# Patient Record
Sex: MALE | Race: WHITE | Employment: OTHER | URBAN - METROPOLITAN AREA
[De-identification: names, ages, dates, MRNs, and addresses within clinical notes are randomized per-mention and may not be internally consistent; named-entity substitution may affect disease eponyms.]

---

## 2018-05-02 ENCOUNTER — TRANSFERRED RECORDS (OUTPATIENT)
Dept: HEALTH INFORMATION MANAGEMENT | Facility: CLINIC | Age: 58
End: 2018-05-02

## 2019-03-13 ENCOUNTER — TRANSFERRED RECORDS (OUTPATIENT)
Dept: HEALTH INFORMATION MANAGEMENT | Facility: CLINIC | Age: 59
End: 2019-03-13

## 2020-02-17 ENCOUNTER — TRANSFERRED RECORDS (OUTPATIENT)
Dept: HEALTH INFORMATION MANAGEMENT | Facility: CLINIC | Age: 60
End: 2020-02-17

## 2020-04-13 ENCOUNTER — TRANSFERRED RECORDS (OUTPATIENT)
Dept: HEALTH INFORMATION MANAGEMENT | Facility: CLINIC | Age: 60
End: 2020-04-13

## 2020-04-16 ENCOUNTER — TRANSFERRED RECORDS (OUTPATIENT)
Dept: HEALTH INFORMATION MANAGEMENT | Facility: CLINIC | Age: 60
End: 2020-04-16

## 2020-04-22 ENCOUNTER — TRANSCRIBE ORDERS (OUTPATIENT)
Dept: OTHER | Age: 60
End: 2020-04-22

## 2020-04-22 DIAGNOSIS — N40.0 BPH (BENIGN PROSTATIC HYPERPLASIA): Primary | ICD-10-CM

## 2020-04-24 ENCOUNTER — TRANSFERRED RECORDS (OUTPATIENT)
Dept: HEALTH INFORMATION MANAGEMENT | Facility: CLINIC | Age: 60
End: 2020-04-24

## 2020-04-27 ENCOUNTER — MEDICAL CORRESPONDENCE (OUTPATIENT)
Dept: HEALTH INFORMATION MANAGEMENT | Facility: CLINIC | Age: 60
End: 2020-04-27

## 2020-04-28 ENCOUNTER — TRANSCRIBE ORDERS (OUTPATIENT)
Dept: OTHER | Age: 60
End: 2020-04-28

## 2020-04-28 DIAGNOSIS — N40.1 BENIGN PROSTATIC HYPERPLASIA WITH LOWER URINARY TRACT SYMPTOMS, SYMPTOM DETAILS UNSPECIFIED: Primary | ICD-10-CM

## 2020-04-29 ENCOUNTER — PRE VISIT (OUTPATIENT)
Dept: UROLOGY | Facility: CLINIC | Age: 60
End: 2020-04-29

## 2020-04-29 NOTE — TELEPHONE ENCOUNTER
Reason for Visit: Consult    Diagnosis: BPH - enlarged prostate    Orders/Procedures/Records: Records available    Contact Patient: N/A    Rooming Requirements: Virtual check in      Thuy Roe  04/29/20  3:36 PM

## 2020-04-30 NOTE — TELEPHONE ENCOUNTER
MEDICAL RECORDS REQUEST   [facility]  Urology Clinic  [address] [place] [address]  PHONE: [phone]  Fax: [phone]        FUTURE VISIT INFORMATION                                                   Pj Monge, : 1960 scheduled for future visit at [facility]    APPOINTMENT INFORMATION:    Date: 20 2PM    Provider:  Koko Price MD    Reason for Visit/Diagnosis: New BPH PT    REFERRAL INFORMATION:    Referring provider:  N/A    Specialty: N/A    Referring providers clinic:  [facility]    Clinic contact number:  N/A    RECORDS REQUESTED FOR VISIT                                                     NOTES  STATUS/DETAILS   OFFICE NOTE from referring provider  yes   OFFICE NOTE from other specialist  yes   DISCHARGE SUMMARY from hospital  no   DISCHARGE REPORT from the ER  no   OPERATIVE REPORT  no   MEDICATION LIST  yes   LABS     URINALYSIS (UA)/ PSA  yes     PRE-VISIT CHECKLIST      Record collection complete Yes- [facility] recs scanned in [facility]rology recs scanned in [facility]    Appointment appropriately scheduled           (right time/right provider) Yes   MyChart activation If no, please explain: In process    Questionnaire complete If no, please explain: In process      Completed by: Beti Craig

## 2020-05-04 ENCOUNTER — DOCUMENTATION ONLY (OUTPATIENT)
Dept: CARE COORDINATION | Facility: CLINIC | Age: 60
End: 2020-05-04

## 2020-05-07 ENCOUNTER — TELEPHONE (OUTPATIENT)
Dept: UROLOGY | Facility: CLINIC | Age: 60
End: 2020-05-07

## 2020-05-07 NOTE — TELEPHONE ENCOUNTER
Spoke with patient to convert their upcoming visit with Dr. Price to a Video Visit. Patient will use their smart phone.      Thuy Roe EMT

## 2020-05-12 ENCOUNTER — DOCUMENTATION ONLY (OUTPATIENT)
Dept: UROLOGY | Facility: CLINIC | Age: 60
End: 2020-05-12

## 2020-05-12 ENCOUNTER — PRE VISIT (OUTPATIENT)
Dept: UROLOGY | Facility: CLINIC | Age: 60
End: 2020-05-12

## 2020-05-12 NOTE — PROGRESS NOTES
Appointment with Dr. Price rescheduled from 5/12 to 5/19 to allow for sufficient records (imaging and Dr. Lazcano's notes) to be added into the patient's chart.     Patient contacted [facility] - Dr. Lazcano to push the appropriate records today (5/12).    Beti Craig was contacted (5/12) to ensure she is aware to look for and receive these records.     Patient stated understanding of the situation and will be ready for a Video Visit on 5/19 at 12:20 PM.      Thuy Roe, EMT

## 2020-05-13 NOTE — TELEPHONE ENCOUNTER
[facility] PACS:  Images:   CT ABD PELVIS 4/16/20   MR Prostate 4/16/20, 5/2/18,7/12/17   CT CHEST ABD 3/13/19, 7/12/18     Notes:   4/13/20 OV note   Labs and follow up notes 12/19/18   OV note 6/27/18   OV note 7/12/17   OV note 7/6/17     Fax to [facility] to fax recs and to not attach recs in [facility] PACS - 5/13/20 8:40AM

## 2020-05-19 ENCOUNTER — VIRTUAL VISIT (OUTPATIENT)
Dept: UROLOGY | Facility: CLINIC | Age: 60
End: 2020-05-19
Payer: COMMERCIAL

## 2020-05-19 DIAGNOSIS — R33.9 URINARY RETENTION: Primary | ICD-10-CM

## 2020-05-19 NOTE — PROGRESS NOTES
Pj Monge is a 59 year old male who is being evaluated via a billable video visit.                UROLOGY VIDEO NEW PATIENT NOTE           Chief Complaint:   Urinary Retention         Interval Update    Pj Monge is a very pleasant 58 yo M with large gland BPH and urinary retention    Brief  History: He has been followed for several years by his referring urologist Dr. Lazcano.  He has a history of elevated PSA between 20 and 29 and had a prostate biopsy 12/16 which was benign.  Prostate volume at the time was 108 gm.  His PSA was 15.8 7/17 and volume 120 gm with 2 PiRADS 3 lesions (later re-read at [facility] as PiRADS 2)  Had another template saturation prostate biopsy 6/18 which was benign 5/18.  Underwent an LAR around that time as well for rectal cancer. PSA has since remained elevated at 19.3 on 2/20.  Over this time period he has continued to manage his bladder with CIC.  He has no issue passing a catheter. Had been deferring any attempt at outlet surgery for quite some time but has recebtly changed his mind as he is sick of catheterizing and is seeking a surgical alternative to allow him to naturally void.      Today notes: Doing fine from a urinary perspective.  Continues to cath 4-5 times per day.  Has not had any issues with hematuria or UTI for the past several weeks but was seen at [facility] 5 weeks ago for presumed septic episode with positive urine vulture.  He can occasionally void small volumes if he waits too long. He does note that he has issues with constipation and difficulty emptying his rectum.  Has questions whether this may be from his massively enlarged prostate.  Underwent prostate MRI 3 weeks ago which revealed prostate size estimated at 173 gm, no PiRADS 3 or greater lesions seen.    Denies family hx of prostate cancer      Physical Exam:   General Appearance: Well groomed, hygenic  Eyes: No redness, discharge  Respiratory: No cough, no respiratory distress or  labored breathing  Musculoskeletal:  grossly normal, full range of motion in upper extremities, no gross deficits  Skin: No discoloration or apparent rashes  Neurologic - No tremors  Psychiatric - Alert and oriented  The rest of a comprehensive physical examination is deferred due to public health emergency video visit restrictions      Labs and Pathology:    I personally reviewed all applicable laboratory data and went over findings with patient  Significant for:    4/13/20 - Cr 1.15      Imaging:    I personally reviewed all applicable imaging including the images themselves and report below and went over the below findings with patient.    No results found for this or any previous visit.           Assessment/Plan   59 year old male with urinary retention, enlarged prostate  After discussion of medical and surgical treatments for BPH including alpha blockers, anticholinergics, transurethral resection, laser ablation, enucleation and simple prostatectomy, Mr. Monge has decided to proceed with Holmium Laser Enucleation of the Prostate (HoLEP).    We discussed the associated risks of this procedure included but not limited to the following:  -Bleeding, potentially significant enough to require clot evacuation and blood transfusion  -Infection, for which we will plan to treat preoperatively based on targeted antibiotic therapy  -Damage to the bladder, urethra and penis including the risk of urethral stricture and bladder neck contracture  -Risk of incidentally discovered prostate cancer.  We discussed that this would not preclude him from further therapy though it could prolong recovery and potentially increase risk of complications associated with cancer treatment.  Further preoperative workup to assess for prostate cancer prior to surgery was offered but patient deferred.  -Risk of retrograde ejaculation which would be expected to occur in the majority if not all men after HoLEP  -Risk of urinary incontinence.   We discussed that in the majority of men this is a transient process that is generally self limited to the first 6-12 weeks after surgery though could take longer to resolve depending on baseline bladder instability.  -Risk of postperative urinary retention though in published series HoLEP has been found to be associated with high success rates of achieving spontaneous voiding even in men with underactive and atonic bladders.  -We will proceed with preoperative clearance with preference to minimize all anticoagulation as deemed acceptable by his primary care provider.          Past Medical History:     Past Medical History:   Diagnosis Date     RA (rheumatoid arthritis) (H)      Rectal cancer (H)             Past Surgical History:   LAR         Medications     Current Outpatient Medications   Medication     baclofen (LIORESAL) 10 MG tablet     medical cannabis (Patient's own supply)     tamsulosin (FLOMAX) 0.4 MG capsule     venlafaxine (EFFEXOR) 75 MG tablet     No current facility-administered medications for this visit.             Family History:   No family history on file.         Social History:     Social History     Socioeconomic History     Marital status:      Spouse name: Not on file     Number of children: Not on file     Years of education: Not on file     Highest education level: Not on file   Occupational History     Not on file   Social Needs     Financial resource strain: Not on file     Food insecurity     Worry: Not on file     Inability: Not on file     Transportation needs     Medical: Not on file     Non-medical: Not on file   Tobacco Use     Smoking status: Current Every Day Smoker     Smokeless tobacco: Never Used   Substance and Sexual Activity     Alcohol use: Not on file     Drug use: Not on file     Sexual activity: Not on file   Lifestyle     Physical activity     Days per week: Not on file     Minutes per session: Not on file     Stress: Not on file   Relationships     Social  "connections     Talks on phone: Not on file     Gets together: Not on file     Attends Congregational service: Not on file     Active member of club or organization: Not on file     Attends meetings of clubs or organizations: Not on file     Relationship status: Not on file     Intimate partner violence     Fear of current or ex partner: Not on file     Emotionally abused: Not on file     Physically abused: Not on file     Forced sexual activity: Not on file   Other Topics Concern     Parent/sibling w/ CABG, MI or angioplasty before 65F 55M? Not Asked   Social History Narrative     Not on file            Allergies:   Patient has no known allergies.         Review of Systems:  From intake questionnaire   Negative 14 system review except as noted on HPI, nurse's note.        CC:  No primary care provider on file.        The patient has been notified of following:     \"This video visit will be conducted via a call between you and your physician/provider. We have found that certain health care needs can be provided without the need for an in-person physical exam.  This service lets us provide the care you need with a video conversation.  If a prescription is necessary we can send it directly to your pharmacy.  If lab work is needed we can place an order for that and you can then stop by our lab to have the test done at a later time.    Video visits are billed at different rates depending on your insurance coverage.  Please reach out to your insurance provider with any questions.    If during the course of the call the physician/provider feels a video visit is not appropriate, you will not be charged for this service.\"    Patient has given verbal consent for Video visit? Yes    How would you like to obtain your AVS? Mail a copy    Patient would like the video invitation sent by: Text to cell phone: [phone]    Will anyone else be joining your video visit? No        Video-Visit Details    Type of service:  Video " Visit    Video Start Time: 12:26  Video End Time: 12:37 (transition to phone an addition 10 minutes due to poor connection)    Originating Location (pt. Location): Home    Distant Location (provider location):  [facility] UROLOGY AND [facility] FOR PROSTATE AND UROLOGIC CANCERS     Platform used for Video Visit: Dionne Price MD

## 2020-05-19 NOTE — LETTER
5/19/2020       RE: Pj Monge  [address]  [address] [place] [address]     Dear Colleague,    Thank you for referring your patient, Pj Monge, to the [facility] UROLOGY AND [facility] FOR PROSTATE AND UROLOGIC CANCERS at [facility]. Please see a copy of my visit note below.    Pj Monge is a 59 year old male who is being evaluated via a billable video visit.                UROLOGY VIDEO NEW PATIENT NOTE           Chief Complaint:   Urinary Retention         Interval Update    Pj Monge is a very pleasant 58 yo M with large gland BPH and urinary retention    Brief  History: He has been followed for several years by his referring urologist Dr. Lazcano.  He has a history of elevated PSA between 20 and 29 and had a prostate biopsy 12/16 which was benign.  Prostate volume at the time was 108 gm.  His PSA was 15.8 7/17 and volume 120 gm with 2 PiRADS 3 lesions (later re-read at [facility] as PiRADS 2)  Had another template saturation prostate biopsy 6/18 which was benign 5/18.  Underwent an LAR around that time as well for rectal cancer. PSA has since remained elevated at 19.3 on 2/20.  Over this time period he has continued to manage his bladder with CIC.  He has no issue passing a catheter. Had been deferring any attempt at outlet surgery for quite some time but has recebtly changed his mind as he is sick of catheterizing and is seeking a surgical alternative to allow him to naturally void.      Today notes: Doing fine from a urinary perspective.  Continues to cath 4-5 times per day.  Has not had any issues with hematuria or UTI for the past several weeks but was seen at [facility] 5 weeks ago for presumed septic episode with positive urine vulture.  He can occasionally void small volumes if he waits too long. He does note that he has issues with constipation and difficulty emptying his rectum.  Has questions whether this may be from his massively  enlarged prostate.  Underwent prostate MRI 3 weeks ago which revealed prostate size estimated at 173 gm, no PiRADS 3 or greater lesions seen.    Denies family hx of prostate cancer      Physical Exam:   General Appearance: Well groomed, hygenic  Eyes: No redness, discharge  Respiratory: No cough, no respiratory distress or labored breathing  Musculoskeletal:  grossly normal, full range of motion in upper extremities, no gross deficits  Skin: No discoloration or apparent rashes  Neurologic - No tremors  Psychiatric - Alert and oriented  The rest of a comprehensive physical examination is deferred due to public health emergency video visit restrictions      Labs and Pathology:    I personally reviewed all applicable laboratory data and went over findings with patient  Significant for:    4/13/20 - Cr 1.15      Imaging:    I personally reviewed all applicable imaging including the images themselves and report below and went over the below findings with patient.    No results found for this or any previous visit.           Assessment/Plan   59 year old male with urinary retention, enlarged prostate  After discussion of medical and surgical treatments for BPH including alpha blockers, anticholinergics, transurethral resection, laser ablation, enucleation and simple prostatectomy, Mr. Monge has decided to proceed with Holmium Laser Enucleation of the Prostate (HoLEP).    We discussed the associated risks of this procedure included but not limited to the following:  -Bleeding, potentially significant enough to require clot evacuation and blood transfusion  -Infection, for which we will plan to treat preoperatively based on targeted antibiotic therapy  -Damage to the bladder, urethra and penis including the risk of urethral stricture and bladder neck contracture  -Risk of incidentally discovered prostate cancer.  We discussed that this would not preclude him from further therapy though it could prolong recovery and  potentially increase risk of complications associated with cancer treatment.  Further preoperative workup to assess for prostate cancer prior to surgery was offered but patient deferred.  -Risk of retrograde ejaculation which would be expected to occur in the majority if not all men after HoLEP  -Risk of urinary incontinence.  We discussed that in the majority of men this is a transient process that is generally self limited to the first 6-12 weeks after surgery though could take longer to resolve depending on baseline bladder instability.  -Risk of postperative urinary retention though in published series HoLEP has been found to be associated with high success rates of achieving spontaneous voiding even in men with underactive and atonic bladders.  -We will proceed with preoperative clearance with preference to minimize all anticoagulation as deemed acceptable by his primary care provider.          Past Medical History:     Past Medical History:   Diagnosis Date     RA (rheumatoid arthritis) (H)      Rectal cancer (H)             Past Surgical History:   LAR         Medications     Current Outpatient Medications   Medication     baclofen (LIORESAL) 10 MG tablet     medical cannabis (Patient's own supply)     tamsulosin (FLOMAX) 0.4 MG capsule     venlafaxine (EFFEXOR) 75 MG tablet     No current facility-administered medications for this visit.             Family History:   No family history on file.         Social History:     Social History     Socioeconomic History     Marital status:      Spouse name: Not on file     Number of children: Not on file     Years of education: Not on file     Highest education level: Not on file   Occupational History     Not on file   Social Needs     Financial resource strain: Not on file     Food insecurity     Worry: Not on file     Inability: Not on file     Transportation needs     Medical: Not on file     Non-medical: Not on file   Tobacco Use     Smoking status:  "Current Every Day Smoker     Smokeless tobacco: Never Used   Substance and Sexual Activity     Alcohol use: Not on file     Drug use: Not on file     Sexual activity: Not on file   Lifestyle     Physical activity     Days per week: Not on file     Minutes per session: Not on file     Stress: Not on file   Relationships     Social connections     Talks on phone: Not on file     Gets together: Not on file     Attends Scientology service: Not on file     Active member of club or organization: Not on file     Attends meetings of clubs or organizations: Not on file     Relationship status: Not on file     Intimate partner violence     Fear of current or ex partner: Not on file     Emotionally abused: Not on file     Physically abused: Not on file     Forced sexual activity: Not on file   Other Topics Concern     Parent/sibling w/ CABG, MI or angioplasty before 65F 55M? Not Asked   Social History Narrative     Not on file            Allergies:   Patient has no known allergies.         Review of Systems:  From intake questionnaire   Negative 14 system review except as noted on HPI, nurse's note.        CC:  No primary care provider on file.        The patient has been notified of following:     \"This video visit will be conducted via a call between you and your physician/provider. We have found that certain health care needs can be provided without the need for an in-person physical exam.  This service lets us provide the care you need with a video conversation.  If a prescription is necessary we can send it directly to your pharmacy.  If lab work is needed we can place an order for that and you can then stop by our lab to have the test done at a later time.    Video visits are billed at different rates depending on your insurance coverage.  Please reach out to your insurance provider with any questions.    If during the course of the call the physician/provider feels a video visit is not appropriate, you will not be charged " "for this service.\"    Patient has given verbal consent for Video visit? Yes    How would you like to obtain your AVS? Mail a copy    Patient would like the video invitation sent by: Text to cell phone: [phone]    Will anyone else be joining your video visit? No        Video-Visit Details    Type of service:  Video Visit    Video Start Time: 12:26  Video End Time: 12:37 (transition to phone an addition 10 minutes due to poor connection)    Originating Location (pt. Location): Home    Distant Location (provider location):  [facility] UROLOGY AND [facility] FOR PROSTATE AND UROLOGIC CANCERS     Platform used for Video Visit: Dionne Price MD    "

## 2020-05-26 DIAGNOSIS — R33.9 URINARY RETENTION: Primary | ICD-10-CM

## 2020-05-26 NOTE — PATIENT INSTRUCTIONS
Our team will be contacting you regarding your procedure.     It was a pleasure meeting with you today.  Thank you for allowing me and my team the privilege of caring for you today.  YOU are the reason we are here, and I truly hope we provided you with the excellent service you deserve.  Please let us know if there is anything else we can do for you so that we can be sure you are leaving completely satisfied with your care experience.        Lakhwinder Decker, EMT

## 2020-06-16 DIAGNOSIS — Z11.59 ENCOUNTER FOR SCREENING FOR OTHER VIRAL DISEASES: Primary | ICD-10-CM

## 2020-06-16 PROBLEM — R33.9 URINARY RETENTION: Status: ACTIVE | Noted: 2020-06-16

## 2020-06-22 ENCOUNTER — TELEPHONE (OUTPATIENT)
Dept: UROLOGY | Facility: CLINIC | Age: 60
End: 2020-06-22

## 2020-06-22 NOTE — TELEPHONE ENCOUNTER
Patient is scheduled for surgery with Dr. Price      Spoke or left message with: Pj    Date of Surgery: 7/16/2020    Location: [place] OR    Informed patient they will need an adult  yes    Pre-op with surgeon (if applicable): n/a    H&P: Patient to schedule with pcp at [facility]    POP: 8/25/2020 @ 9a    Additional imaging/appointments: n/a    Surgery packet: mailed on 6/22/2020     Additional comments: n/a

## 2020-07-01 DIAGNOSIS — R30.0 DYSURIA: Primary | ICD-10-CM

## 2020-07-03 ENCOUNTER — PATIENT OUTREACH (OUTPATIENT)
Dept: UROLOGY | Facility: CLINIC | Age: 60
End: 2020-07-03

## 2020-07-10 ENCOUNTER — PATIENT OUTREACH (OUTPATIENT)
Dept: UROLOGY | Facility: CLINIC | Age: 60
End: 2020-07-10

## 2020-07-10 DIAGNOSIS — R30.0 DYSURIA: Primary | ICD-10-CM

## 2020-07-10 RX ORDER — CIPROFLOXACIN 500 MG/1
500 TABLET, FILM COATED ORAL 2 TIMES DAILY
Qty: 14 TABLET | Refills: 0 | Status: SHIPPED | OUTPATIENT
Start: 2020-07-10 | End: 2020-07-17

## 2020-07-10 NOTE — TELEPHONE ENCOUNTER
I called and spoke with patient to let him know to  and start taking Cipro on Monday in preparation for surgery next week. Patient agreed to plan. Melissa Garduno RN

## 2020-07-13 DIAGNOSIS — Z11.59 ENCOUNTER FOR SCREENING FOR OTHER VIRAL DISEASES: ICD-10-CM

## 2020-07-13 PROCEDURE — U0003 INFECTIOUS AGENT DETECTION BY NUCLEIC ACID (DNA OR RNA); SEVERE ACUTE RESPIRATORY SYNDROME CORONAVIRUS 2 (SARS-COV-2) (CORONAVIRUS DISEASE [COVID-19]), AMPLIFIED PROBE TECHNIQUE, MAKING USE OF HIGH THROUGHPUT TECHNOLOGIES AS DESCRIBED BY CMS-2020-01-R: HCPCS | Performed by: UROLOGY

## 2020-07-13 PROCEDURE — 99207 ZZC NO CHARGE LOS: CPT

## 2020-07-14 LAB
SARS-COV-2 RNA SPEC QL NAA+PROBE: NOT DETECTED
SPECIMEN SOURCE: NORMAL

## 2020-07-14 NOTE — PROGRESS NOTES
Spoke to patient, he believes he will be staying overnight for this procedure. He does not have a ride home post op if he is going home.  Called and discussed with CIRO TorresCC for Dr Price, she confirmed he would stay over night. Called pt back to confirm overnight stay

## 2020-07-15 ENCOUNTER — ANESTHESIA EVENT (OUTPATIENT)
Dept: SURGERY | Facility: CLINIC | Age: 60
End: 2020-07-15
Payer: COMMERCIAL

## 2020-07-16 ENCOUNTER — ANESTHESIA (OUTPATIENT)
Dept: SURGERY | Facility: CLINIC | Age: 60
End: 2020-07-16
Payer: COMMERCIAL

## 2020-07-16 ENCOUNTER — HOSPITAL ENCOUNTER (OUTPATIENT)
Facility: CLINIC | Age: 60
Discharge: HOME HEALTH | End: 2020-07-17
Attending: UROLOGY | Admitting: UROLOGY
Payer: COMMERCIAL

## 2020-07-16 DIAGNOSIS — R33.9 URINARY RETENTION: ICD-10-CM

## 2020-07-16 PROBLEM — N40.1 BPH WITH URINARY OBSTRUCTION: Status: ACTIVE | Noted: 2020-07-16

## 2020-07-16 PROBLEM — N13.8 BPH WITH URINARY OBSTRUCTION: Status: ACTIVE | Noted: 2020-07-16

## 2020-07-16 LAB
ABO + RH BLD: NORMAL
ABO + RH BLD: NORMAL
BLD GP AB SCN SERPL QL: NORMAL
BLOOD BANK CMNT PATIENT-IMP: NORMAL
GLUCOSE BLDC GLUCOMTR-MCNC: 114 MG/DL (ref 70–99)
SPECIMEN EXP DATE BLD: NORMAL

## 2020-07-16 PROCEDURE — 40000170 ZZH STATISTIC PRE-PROCEDURE ASSESSMENT II: Performed by: UROLOGY

## 2020-07-16 PROCEDURE — 82962 GLUCOSE BLOOD TEST: CPT

## 2020-07-16 PROCEDURE — 37000008 ZZH ANESTHESIA TECHNICAL FEE, 1ST 30 MIN: Performed by: UROLOGY

## 2020-07-16 PROCEDURE — 71000015 ZZH RECOVERY PHASE 1 LEVEL 2 EA ADDTL HR: Performed by: UROLOGY

## 2020-07-16 PROCEDURE — 86901 BLOOD TYPING SEROLOGIC RH(D): CPT | Performed by: UROLOGY

## 2020-07-16 PROCEDURE — 25800030 ZZH RX IP 258 OP 636: Performed by: NURSE ANESTHETIST, CERTIFIED REGISTERED

## 2020-07-16 PROCEDURE — 36415 COLL VENOUS BLD VENIPUNCTURE: CPT | Performed by: UROLOGY

## 2020-07-16 PROCEDURE — C1758 CATHETER, URETERAL: HCPCS | Performed by: UROLOGY

## 2020-07-16 PROCEDURE — 36000064 ZZH SURGERY LEVEL 4 EA 15 ADDTL MIN - UMMC: Performed by: UROLOGY

## 2020-07-16 PROCEDURE — 86900 BLOOD TYPING SEROLOGIC ABO: CPT | Performed by: UROLOGY

## 2020-07-16 PROCEDURE — 25000128 H RX IP 250 OP 636: Performed by: UROLOGY

## 2020-07-16 PROCEDURE — 86850 RBC ANTIBODY SCREEN: CPT | Performed by: UROLOGY

## 2020-07-16 PROCEDURE — 25000125 ZZHC RX 250: Performed by: UROLOGY

## 2020-07-16 PROCEDURE — 25000128 H RX IP 250 OP 636: Performed by: STUDENT IN AN ORGANIZED HEALTH CARE EDUCATION/TRAINING PROGRAM

## 2020-07-16 PROCEDURE — 27210794 ZZH OR GENERAL SUPPLY STERILE: Performed by: UROLOGY

## 2020-07-16 PROCEDURE — 25800030 ZZH RX IP 258 OP 636: Performed by: UROLOGY

## 2020-07-16 PROCEDURE — 25000132 ZZH RX MED GY IP 250 OP 250 PS 637: Performed by: ANESTHESIOLOGY

## 2020-07-16 PROCEDURE — 88305 TISSUE EXAM BY PATHOLOGIST: CPT | Mod: 26 | Performed by: UROLOGY

## 2020-07-16 PROCEDURE — 36000062 ZZH SURGERY LEVEL 4 1ST 30 MIN - UMMC: Performed by: UROLOGY

## 2020-07-16 PROCEDURE — 25000128 H RX IP 250 OP 636: Performed by: NURSE ANESTHETIST, CERTIFIED REGISTERED

## 2020-07-16 PROCEDURE — 25000132 ZZH RX MED GY IP 250 OP 250 PS 637: Performed by: UROLOGY

## 2020-07-16 PROCEDURE — 88305 TISSUE EXAM BY PATHOLOGIST: CPT | Performed by: UROLOGY

## 2020-07-16 PROCEDURE — 71000014 ZZH RECOVERY PHASE 1 LEVEL 2 FIRST HR: Performed by: UROLOGY

## 2020-07-16 PROCEDURE — 37000009 ZZH ANESTHESIA TECHNICAL FEE, EACH ADDTL 15 MIN: Performed by: UROLOGY

## 2020-07-16 PROCEDURE — 25000125 ZZHC RX 250: Performed by: NURSE ANESTHETIST, CERTIFIED REGISTERED

## 2020-07-16 PROCEDURE — 25000566 ZZH SEVOFLURANE, EA 15 MIN: Performed by: UROLOGY

## 2020-07-16 RX ORDER — OXYCODONE HYDROCHLORIDE 5 MG/1
5-10 TABLET ORAL EVERY 4 HOURS PRN
Status: DISCONTINUED | OUTPATIENT
Start: 2020-07-16 | End: 2020-07-17 | Stop reason: HOSPADM

## 2020-07-16 RX ORDER — ONDANSETRON 2 MG/ML
4 INJECTION INTRAMUSCULAR; INTRAVENOUS EVERY 30 MIN PRN
Status: DISCONTINUED | OUTPATIENT
Start: 2020-07-16 | End: 2020-07-16 | Stop reason: HOSPADM

## 2020-07-16 RX ORDER — NALOXONE HYDROCHLORIDE 0.4 MG/ML
.1-.4 INJECTION, SOLUTION INTRAMUSCULAR; INTRAVENOUS; SUBCUTANEOUS
Status: DISCONTINUED | OUTPATIENT
Start: 2020-07-16 | End: 2020-07-17 | Stop reason: HOSPADM

## 2020-07-16 RX ORDER — ACETAMINOPHEN 325 MG/1
650 TABLET ORAL EVERY 6 HOURS PRN
Status: DISCONTINUED | OUTPATIENT
Start: 2020-07-16 | End: 2020-07-17 | Stop reason: HOSPADM

## 2020-07-16 RX ORDER — FENTANYL CITRATE 50 UG/ML
25-50 INJECTION, SOLUTION INTRAMUSCULAR; INTRAVENOUS
Status: DISCONTINUED | OUTPATIENT
Start: 2020-07-16 | End: 2020-07-16 | Stop reason: HOSPADM

## 2020-07-16 RX ORDER — PROPOFOL 10 MG/ML
INJECTION, EMULSION INTRAVENOUS PRN
Status: DISCONTINUED | OUTPATIENT
Start: 2020-07-16 | End: 2020-07-16

## 2020-07-16 RX ORDER — SODIUM CHLORIDE, SODIUM LACTATE, POTASSIUM CHLORIDE, CALCIUM CHLORIDE 600; 310; 30; 20 MG/100ML; MG/100ML; MG/100ML; MG/100ML
INJECTION, SOLUTION INTRAVENOUS CONTINUOUS PRN
Status: DISCONTINUED | OUTPATIENT
Start: 2020-07-16 | End: 2020-07-16

## 2020-07-16 RX ORDER — POLYETHYLENE GLYCOL 3350 17 G
2 POWDER IN PACKET (EA) ORAL
Status: DISCONTINUED | OUTPATIENT
Start: 2020-07-16 | End: 2020-07-17 | Stop reason: HOSPADM

## 2020-07-16 RX ORDER — FENTANYL CITRATE 50 UG/ML
INJECTION, SOLUTION INTRAMUSCULAR; INTRAVENOUS PRN
Status: DISCONTINUED | OUTPATIENT
Start: 2020-07-16 | End: 2020-07-16

## 2020-07-16 RX ORDER — GABAPENTIN 100 MG/1
300 CAPSULE ORAL ONCE
Status: COMPLETED | OUTPATIENT
Start: 2020-07-16 | End: 2020-07-16

## 2020-07-16 RX ORDER — VENLAFAXINE HYDROCHLORIDE 75 MG/1
75 TABLET, EXTENDED RELEASE ORAL
Status: DISCONTINUED | OUTPATIENT
Start: 2020-07-17 | End: 2020-07-17 | Stop reason: HOSPADM

## 2020-07-16 RX ORDER — LIDOCAINE HYDROCHLORIDE 20 MG/ML
INJECTION, SOLUTION INFILTRATION; PERINEURAL PRN
Status: DISCONTINUED | OUTPATIENT
Start: 2020-07-16 | End: 2020-07-16

## 2020-07-16 RX ORDER — HYDROMORPHONE HYDROCHLORIDE 1 MG/ML
.3-.5 INJECTION, SOLUTION INTRAMUSCULAR; INTRAVENOUS; SUBCUTANEOUS EVERY 10 MIN PRN
Status: DISCONTINUED | OUTPATIENT
Start: 2020-07-16 | End: 2020-07-16 | Stop reason: HOSPADM

## 2020-07-16 RX ORDER — GLYCOPYRROLATE 0.2 MG/ML
INJECTION, SOLUTION INTRAMUSCULAR; INTRAVENOUS PRN
Status: DISCONTINUED | OUTPATIENT
Start: 2020-07-16 | End: 2020-07-16

## 2020-07-16 RX ORDER — ACETAMINOPHEN 325 MG/1
975 TABLET ORAL ONCE
Status: COMPLETED | OUTPATIENT
Start: 2020-07-16 | End: 2020-07-16

## 2020-07-16 RX ORDER — LIDOCAINE 40 MG/G
CREAM TOPICAL
Status: DISCONTINUED | OUTPATIENT
Start: 2020-07-16 | End: 2020-07-17 | Stop reason: HOSPADM

## 2020-07-16 RX ORDER — MEPERIDINE HYDROCHLORIDE 25 MG/ML
12.5 INJECTION INTRAMUSCULAR; INTRAVENOUS; SUBCUTANEOUS
Status: DISCONTINUED | OUTPATIENT
Start: 2020-07-16 | End: 2020-07-16 | Stop reason: HOSPADM

## 2020-07-16 RX ORDER — VENLAFAXINE HYDROCHLORIDE 75 MG/1
75 CAPSULE, EXTENDED RELEASE ORAL DAILY
COMMUNITY

## 2020-07-16 RX ORDER — ONDANSETRON 4 MG/1
4 TABLET, ORALLY DISINTEGRATING ORAL EVERY 30 MIN PRN
Status: DISCONTINUED | OUTPATIENT
Start: 2020-07-16 | End: 2020-07-16 | Stop reason: HOSPADM

## 2020-07-16 RX ORDER — NALOXONE HYDROCHLORIDE 0.4 MG/ML
.1-.4 INJECTION, SOLUTION INTRAMUSCULAR; INTRAVENOUS; SUBCUTANEOUS
Status: DISCONTINUED | OUTPATIENT
Start: 2020-07-16 | End: 2020-07-16 | Stop reason: HOSPADM

## 2020-07-16 RX ORDER — SODIUM CHLORIDE, SODIUM LACTATE, POTASSIUM CHLORIDE, CALCIUM CHLORIDE 600; 310; 30; 20 MG/100ML; MG/100ML; MG/100ML; MG/100ML
INJECTION, SOLUTION INTRAVENOUS CONTINUOUS
Status: DISCONTINUED | OUTPATIENT
Start: 2020-07-16 | End: 2020-07-16 | Stop reason: HOSPADM

## 2020-07-16 RX ORDER — ONDANSETRON 4 MG/1
4 TABLET, ORALLY DISINTEGRATING ORAL EVERY 6 HOURS PRN
Status: DISCONTINUED | OUTPATIENT
Start: 2020-07-16 | End: 2020-07-17 | Stop reason: HOSPADM

## 2020-07-16 RX ORDER — VANCOMYCIN HYDROCHLORIDE 1 G/200ML
1000 INJECTION, SOLUTION INTRAVENOUS EVERY 12 HOURS
Status: COMPLETED | OUTPATIENT
Start: 2020-07-16 | End: 2020-07-17

## 2020-07-16 RX ORDER — VENLAFAXINE 75 MG/1
75 TABLET ORAL 3 TIMES DAILY
Status: DISCONTINUED | OUTPATIENT
Start: 2020-07-16 | End: 2020-07-16

## 2020-07-16 RX ORDER — SODIUM CHLORIDE 9 MG/ML
INJECTION, SOLUTION INTRAVENOUS CONTINUOUS
Status: DISCONTINUED | OUTPATIENT
Start: 2020-07-16 | End: 2020-07-17

## 2020-07-16 RX ORDER — ATROPA BELLADONNA AND OPIUM 16.2; 3 MG/1; MG/1
30 SUPPOSITORY RECTAL 3 TIMES DAILY PRN
Status: DISCONTINUED | OUTPATIENT
Start: 2020-07-16 | End: 2020-07-17 | Stop reason: HOSPADM

## 2020-07-16 RX ORDER — VANCOMYCIN HYDROCHLORIDE 1 G/200ML
1000 INJECTION, SOLUTION INTRAVENOUS EVERY 12 HOURS
Status: DISCONTINUED | OUTPATIENT
Start: 2020-07-16 | End: 2020-07-16 | Stop reason: HOSPADM

## 2020-07-16 RX ORDER — ONDANSETRON 2 MG/ML
4 INJECTION INTRAMUSCULAR; INTRAVENOUS EVERY 6 HOURS PRN
Status: DISCONTINUED | OUTPATIENT
Start: 2020-07-16 | End: 2020-07-17 | Stop reason: HOSPADM

## 2020-07-16 RX ORDER — DEXAMETHASONE SODIUM PHOSPHATE 4 MG/ML
INJECTION, SOLUTION INTRA-ARTICULAR; INTRALESIONAL; INTRAMUSCULAR; INTRAVENOUS; SOFT TISSUE PRN
Status: DISCONTINUED | OUTPATIENT
Start: 2020-07-16 | End: 2020-07-16

## 2020-07-16 RX ADMIN — FENTANYL CITRATE 25 MCG: 50 INJECTION, SOLUTION INTRAMUSCULAR; INTRAVENOUS at 12:56

## 2020-07-16 RX ADMIN — HYDROMORPHONE HYDROCHLORIDE 0.5 MG: 1 INJECTION, SOLUTION INTRAMUSCULAR; INTRAVENOUS; SUBCUTANEOUS at 12:27

## 2020-07-16 RX ADMIN — DEXAMETHASONE SODIUM PHOSPHATE 4 MG: 4 INJECTION, SOLUTION INTRAMUSCULAR; INTRAVENOUS at 11:23

## 2020-07-16 RX ADMIN — TRANEXAMIC ACID 1 G: 100 INJECTION, SOLUTION INTRAVENOUS at 10:57

## 2020-07-16 RX ADMIN — MIDAZOLAM 2 MG: 1 INJECTION INTRAMUSCULAR; INTRAVENOUS at 11:03

## 2020-07-16 RX ADMIN — PROPOFOL 150 MG: 10 INJECTION, EMULSION INTRAVENOUS at 11:12

## 2020-07-16 RX ADMIN — FENTANYL CITRATE 50 MCG: 50 INJECTION, SOLUTION INTRAMUSCULAR; INTRAVENOUS at 11:12

## 2020-07-16 RX ADMIN — GABAPENTIN 300 MG: 300 CAPSULE ORAL at 09:22

## 2020-07-16 RX ADMIN — GLYCOPYRROLATE 0.2 MG: 0.2 INJECTION, SOLUTION INTRAMUSCULAR; INTRAVENOUS at 11:26

## 2020-07-16 RX ADMIN — GENTAMICIN SULFATE 240 MG: 40 INJECTION, SOLUTION INTRAMUSCULAR; INTRAVENOUS at 11:03

## 2020-07-16 RX ADMIN — NICOTINE POLACRILEX 2 MG: 2 LOZENGE ORAL at 22:09

## 2020-07-16 RX ADMIN — SODIUM CHLORIDE, POTASSIUM CHLORIDE, SODIUM LACTATE AND CALCIUM CHLORIDE: 600; 310; 30; 20 INJECTION, SOLUTION INTRAVENOUS at 11:03

## 2020-07-16 RX ADMIN — ROCURONIUM BROMIDE 50 MG: 10 INJECTION INTRAVENOUS at 11:12

## 2020-07-16 RX ADMIN — SUGAMMADEX 200 MG: 100 INJECTION, SOLUTION INTRAVENOUS at 13:16

## 2020-07-16 RX ADMIN — FENTANYL CITRATE 50 MCG: 50 INJECTION, SOLUTION INTRAMUSCULAR; INTRAVENOUS at 11:33

## 2020-07-16 RX ADMIN — VANCOMYCIN HYDROCHLORIDE 1000 MG: 1 INJECTION, SOLUTION INTRAVENOUS at 23:33

## 2020-07-16 RX ADMIN — ROCURONIUM BROMIDE 20 MG: 10 INJECTION INTRAVENOUS at 12:00

## 2020-07-16 RX ADMIN — ACETAMINOPHEN 975 MG: 325 TABLET, FILM COATED ORAL at 09:22

## 2020-07-16 RX ADMIN — GENTAMICIN SULFATE 140 MG: 40 INJECTION, SOLUTION INTRAMUSCULAR; INTRAVENOUS at 22:10

## 2020-07-16 RX ADMIN — LIDOCAINE HYDROCHLORIDE 60 MG: 20 INJECTION, SOLUTION INFILTRATION; PERINEURAL at 11:12

## 2020-07-16 RX ADMIN — VANCOMYCIN HYDROCHLORIDE 1000 MG: 1 INJECTION, SOLUTION INTRAVENOUS at 11:03

## 2020-07-16 RX ADMIN — PROPOFOL 30 MG: 10 INJECTION, EMULSION INTRAVENOUS at 12:56

## 2020-07-16 ASSESSMENT — MIFFLIN-ST. JEOR: SCORE: 1460.38

## 2020-07-16 NOTE — PROVIDER NOTIFICATION
Has Rheumatoid Arthritis.  All joints hurt at times but wrists hurt the most.  Can walk and do activities of daily living.

## 2020-07-16 NOTE — BRIEF OP NOTE
[facility] [place]    Brief Operative Note    Pre-operative diagnosis: Urinary retention [R33.9]  Post-operative diagnosis Same as pre-operative diagnosis    Procedure: Procedure(s):  ENUCLEATION, PROSTATE, USING HOLMIUM LASER  Surgeon: Surgeon(s) and Role:     * Koko Price MD - Primary     * Evan Grant MD - Resident - Assisting  Anesthesia: General   Estimated blood loss: 200 ml  Drains:  22F 3-way catheter  Specimens:   ID Type Source Tests Collected by Time Destination   A : prostate tissue Tissue Prostate SURGICAL PATHOLOGY EXAM Koko Price MD 7/16/2020  1:04 PM      Findings:   Bilobar appearance of prostate, each lobe taken separately, morcellation performed.  Complications: None.  Implants: * No implants in log *    Plan:  - Observe overnight on CBI  - Irrigate/TOV in AM  - Discharge with cipro

## 2020-07-16 NOTE — ANESTHESIA CARE TRANSFER NOTE
Patient: Pj Monge    Procedure(s):  ENUCLEATION, PROSTATE, USING HOLMIUM LASER    Diagnosis: Urinary retention [R33.9]  Diagnosis Additional Information: No value filed.    Anesthesia Type:   General     Note:  Airway :Face Mask  Patient transferred to:PACU  Comments: Report to [place] RN  Pt awake and talking. Aware of catheter discomfort.  VSS Temp 36.6 po C  RR 16, diminished (smoker, Nicotine patch placed right shoulder in OR)  145/91  58 Hr  Other: refer to Intra op RecordHandoff Report: Identifed the Patient, Identified the Reponsible Provider, Reviewed the pertinent medical history, Discussed the surgical course, Reviewed Intra-OP anesthesia mangement and issues during anesthesia, Set expectations for post-procedure period and Allowed opportunity for questions and acknowledgement of understanding      Vitals: (Last set prior to Anesthesia Care Transfer)    CRNA VITALS  7/16/2020 1252 - 7/16/2020 1335      7/16/2020             Resp Rate (observed):  16                Electronically Signed By: REGIS Du CRNA  July 16, 2020  1:35 PM

## 2020-07-16 NOTE — OR NURSING
Dr. Price here at bedside in PACU.  Speaks with patient, examines UOP, current CBI needing to be fully open to produce pale pink urine.  Per MD, ok to run wide open for a while.  Urine looks goal color at this time.

## 2020-07-16 NOTE — ANESTHESIA PREPROCEDURE EVALUATION
Anesthesia Pre-Procedure Evaluation    Patient: Pj Monge   MRN:     [id number] Gender:   male   Age:    60 year old :      1960        Preoperative Diagnosis: Urinary retention [R33.9]   Procedure(s):  ENUCLEATION, PROSTATE, USING HOLMIUM LASER     LABS:  CBC: No results found for: WBC, HGB, HCT, PLT  BMP: No results found for: NA, POTASSIUM, CHLORIDE, CO2, BUN, CR, GLC  COAGS: No results found for: PTT, INR, FIBR  POC: No results found for: BGM, HCG, HCGS  OTHER: No results found for: PH, LACT, A1C, PINEDA, PHOS, MAG, ALBUMIN, PROTTOTAL, ALT, AST, GGT, ALKPHOS, BILITOTAL, BILIDIRECT, LIPASE, AMYLASE, ANJEL, TSH, T4, T3, CRP, SED     Preop Vitals    BP Readings from Last 3 Encounters:   No data found for BP    Pulse Readings from Last 3 Encounters:   No data found for Pulse      Resp Readings from Last 3 Encounters:   No data found for Resp    SpO2 Readings from Last 3 Encounters:   No data found for SpO2      Temp Readings from Last 1 Encounters:   No data found for Temp    Ht Readings from Last 1 Encounters:   No data found for Ht      Wt Readings from Last 1 Encounters:   No data found for Wt    There is no height or weight on file to calculate BMI.     LDA:        Past Medical History:   Diagnosis Date     RA (rheumatoid arthritis) (H)      Rectal cancer (H)       History reviewed. No pertinent surgical history.   No Known Allergies          [facility] AN PHYSICAL EXAM    Assessment:   ASA SCORE: 2    H&P: History and physical reviewed and following examination; no interval change.         Plan:   Anes. Type:  General   Pre-Medication: None   Induction:  IV (Standard)   Airway: ETT; Oral   Access/Monitoring: PIV   Maintenance: Balanced     Postop Plan:   Postop Pain: Opioids  Postop Sedation/Airway: Not planned     PONV Management:   Adult Risk Factors:, Postop Opioids   Prevention: Ondansetron, Dexamethasone                   Mady Luo MD

## 2020-07-16 NOTE — OR NURSING
PACU to Inpatient Nursing Handoff    Patient Pj Monge is a 60 year old male who speaks English.   Procedure Procedure(s):  ENUCLEATION, PROSTATE, USING HOLMIUM LASER   Surgeon(s) Primary: Koko Price MD  Resident - Assisting: Evan Grant MD     No Known Allergies    Isolation  No active isolations     Past Medical History   has a past medical history of RA (rheumatoid arthritis) (H) and Rectal cancer (H).    Anesthesia General   Dermatome Level     Preop Meds acetaminophen (Tylenol) - time given: 0922  gabapentin (Neurontin) - time given: 0922   Nerve block Not applicable   Intraop Meds dexamethasone (Decadron)  fentanyl (Sublimaze): 125 mcg total  hydromorphone (Dilaudid): 0.5 mg total  ondansetron (Zofran): last given at 1300  Versed 2mg IV at 1103   Local Meds No   Antibiotics vancomycin (Vancocin) - last given at 1103  Gentamicin - last given at 1103     Pain Patient Currently in Pain: sleeping: patient not able to self report  Comfort: negligible pain   PACU meds  Not applicable   PCA / epidural No   Capnography     Telemetry ECG Rhythm: Sinus bradycardia   Inpatient Telemetry Monitor Ordered? No        Labs Glucose No results found for: GLC    Hgb No results found for: HGB    INR No results found for: INR   PACU Imaging Not applicable     Wound/Incision     CMS        Equipment CBI   Other LDA       IV Access Peripheral IV 07/16/20 Right;Posterior Lower forearm (Active)   Site Assessment WDL 07/16/20 1430   Line Status Infusing 07/16/20 1430   Phlebitis Scale 0-->no symptoms 07/16/20 1430   Infiltration Scale 0 07/16/20 1430   Number of days: 0      Blood Products Not applicable  mL   Intake/Output Date 07/16/20 0700 - 07/17/20 0659   Shift 6368-2255 8586-9570 9039-1973 24 Hour Total   INTAKE   I.V. 900   900   Shift Total(mL/kg) 900(13.53)   900(13.53)   OUTPUT   Urine -150   -150   Blood 200   200   Shift Total(mL/kg) 50(0.75)   50(0.75)   Weight (kg) 66.5 66.5 66.5 66.5       Drains / Vega Urethral Catheter Latex;Triple-lumen 22 fr (Active)   Catheter Care Done 07/16/20 1324   Collection Container Patent;Other (Comment) 07/16/20 1430   Securement Method Securing device (Describe) 07/16/20 1430   Rationale for Continued Use /GI/GYN Pelvic Procedure 07/16/20 1430   Number of days: 0      Time of void PreOp Void Prior to Procedure: 0730 (07/16/20 0915)    PostOp      Diapered? No   Bladder Scan     PO    ice chips     Vitals    B/P: 125/69  T: 97.5  F (36.4  C)    Temp src: Axillary  P:  Pulse: 55 (07/16/20 1430)    Heart Rate: 55 (07/16/20 1430)     R: 13  O2:  SpO2: 99 %    O2 Device: Nasal cannula (07/16/20 1430)    Oxygen Delivery: 3 LPM (07/16/20 1430)         Family/support present No one here, Contact brother only if emergency   Patient belongings     Patient transported on cart   DC meds/scripts (obs/outpt) Not applicable   Inpatient Pain Meds Released? Yes       Special needs/considerations 1 PPD Smoker, Jose Patch R Deltiod   Tasks needing completion none       Jammie Modi, RN  ASCOM *[phone]

## 2020-07-16 NOTE — PHARMACY-AMINOGLYCOSIDE DOSING SERVICE
Pharmacy Aminoglycoside Initial Note  Date of Service 2020  Patient's  1960  60 year old, male    Weight (Actual):  66.5 kg    Indication: post prophylaxis    Current estimated CrCl = CrCl cannot be calculated (No successful lab value found.).    Creatinine for last 3 days  No results found for requested labs within last 72 hours.     Nephrotoxins and other renal medications (From now, onward)    Start     Dose/Rate Route Frequency Ordered Stop    20 2300  vancomycin (VANCOCIN) 1000 mg in dextrose 5% 200 mL PREMIX      1,000 mg  200 mL/hr over 1 Hours Intravenous EVERY 12 HOURS 20 1609 20 1059    20 2200  gentamicin (GARAMYCIN) 140 mg in sodium chloride 0.9 % 50 mL intermittent infusion      2 mg/kg × 66.5 kg  over 60 Minutes Intravenous ONCE 20 1811            Contrast Orders - past 72 hours (72h ago, onward)    None          Aminoglycoside Levels - past 2 days  No results found for requested labs within last 48 hours.    Aminoglycosides IV Administrations (past 72 hours)                   gentamicin (GARAMYCIN) 240 mg in sodium chloride 0.9 % 100 mL intermittent infusion (mg) 240 mg New Bag 20 1103                    Plan:  1.  Start Gentamicin 140  mg (2 mg/kg) x once, 12 hours after the last dose. First dose was 240 mg ( 3.6 mg/kg).   2.  Target goals based on conventional dosing  No further follow up is needed unless Gentamicin is continued beyond post op prophylaxis.     Thalia Duque, [facility], PharmD.

## 2020-07-17 VITALS
WEIGHT: 146.61 LBS | BODY MASS INDEX: 21.71 KG/M2 | HEIGHT: 69 IN | OXYGEN SATURATION: 94 % | TEMPERATURE: 97.5 F | DIASTOLIC BLOOD PRESSURE: 58 MMHG | RESPIRATION RATE: 17 BRPM | HEART RATE: 59 BPM | SYSTOLIC BLOOD PRESSURE: 109 MMHG

## 2020-07-17 LAB
ANION GAP SERPL CALCULATED.3IONS-SCNC: 1 MMOL/L (ref 3–14)
BUN SERPL-MCNC: 10 MG/DL (ref 7–30)
CALCIUM SERPL-MCNC: 8.1 MG/DL (ref 8.5–10.1)
CHLORIDE SERPL-SCNC: 109 MMOL/L (ref 94–109)
CO2 SERPL-SCNC: 33 MMOL/L (ref 20–32)
CREAT SERPL-MCNC: 0.91 MG/DL (ref 0.66–1.25)
ERYTHROCYTE [DISTWIDTH] IN BLOOD BY AUTOMATED COUNT: 13.2 % (ref 10–15)
GFR SERPL CREATININE-BSD FRML MDRD: >90 ML/MIN/{1.73_M2}
GLUCOSE SERPL-MCNC: 92 MG/DL (ref 70–99)
HCT VFR BLD AUTO: 34.5 % (ref 40–53)
HGB BLD-MCNC: 11.3 G/DL (ref 13.3–17.7)
MCH RBC QN AUTO: 32.3 PG (ref 26.5–33)
MCHC RBC AUTO-ENTMCNC: 32.8 G/DL (ref 31.5–36.5)
MCV RBC AUTO: 99 FL (ref 78–100)
PLATELET # BLD AUTO: 253 10E9/L (ref 150–450)
POTASSIUM SERPL-SCNC: 4.2 MMOL/L (ref 3.4–5.3)
RBC # BLD AUTO: 3.5 10E12/L (ref 4.4–5.9)
SODIUM SERPL-SCNC: 143 MMOL/L (ref 133–144)
WBC # BLD AUTO: 12.6 10E9/L (ref 4–11)

## 2020-07-17 PROCEDURE — 80048 BASIC METABOLIC PNL TOTAL CA: CPT | Performed by: STUDENT IN AN ORGANIZED HEALTH CARE EDUCATION/TRAINING PROGRAM

## 2020-07-17 PROCEDURE — 25800025 ZZH RX 258: Performed by: STUDENT IN AN ORGANIZED HEALTH CARE EDUCATION/TRAINING PROGRAM

## 2020-07-17 PROCEDURE — 25000132 ZZH RX MED GY IP 250 OP 250 PS 637: Performed by: STUDENT IN AN ORGANIZED HEALTH CARE EDUCATION/TRAINING PROGRAM

## 2020-07-17 PROCEDURE — 25000132 ZZH RX MED GY IP 250 OP 250 PS 637

## 2020-07-17 PROCEDURE — 85027 COMPLETE CBC AUTOMATED: CPT | Performed by: STUDENT IN AN ORGANIZED HEALTH CARE EDUCATION/TRAINING PROGRAM

## 2020-07-17 PROCEDURE — 36415 COLL VENOUS BLD VENIPUNCTURE: CPT | Performed by: STUDENT IN AN ORGANIZED HEALTH CARE EDUCATION/TRAINING PROGRAM

## 2020-07-17 RX ORDER — CALCIUM CARBONATE 500 MG/1
500 TABLET, CHEWABLE ORAL DAILY PRN
Status: DISCONTINUED | OUTPATIENT
Start: 2020-07-17 | End: 2020-07-17 | Stop reason: HOSPADM

## 2020-07-17 RX ORDER — ACETAMINOPHEN 325 MG/1
650 TABLET ORAL EVERY 6 HOURS PRN
Qty: 100 TABLET | Refills: 0 | Status: SHIPPED | OUTPATIENT
Start: 2020-07-17

## 2020-07-17 RX ADMIN — VENLAFAXINE HYDROCHLORIDE 75 MG: 75 TABLET, EXTENDED RELEASE ORAL at 08:08

## 2020-07-17 RX ADMIN — SODIUM CHLORIDE 3000 ML: 900 IRRIGANT IRRIGATION at 00:14

## 2020-07-17 RX ADMIN — SODIUM CHLORIDE: 900 IRRIGANT IRRIGATION at 02:45

## 2020-07-17 RX ADMIN — CALCIUM CARBONATE (ANTACID) CHEW TAB 500 MG 500 MG: 500 CHEW TAB at 06:30

## 2020-07-17 NOTE — PLAN OF CARE
Pt A/O X 4. Afebrile. VSS. Lungs-Clear bilaterally with both anterior and posterior. IS encouraged. Bowels-Hyperactive in all four quadrants. Voids spontaneously without difficulty into the bedside urinal. Pt voided 125 mL's red/pink output and was bladder scanned with 168 mL's PVR @ 11:33AM,  web-paged and informed. Denies nausea and vomiting. CMS and Neuro's are intact. Denies numbness and tingling in all extremities. Denies pain. Is on a Regular diet and appetite was Good this shift. Pt up in room independently. PIV removed for discharge. PCD's on BLE's. Bilateral heels are elevated off the bed. Pt is able to make needs known, and call light is within reach. Pt. discharged at 13:05PM to home, and left with personal belongings. Pt. received complete discharge paperwork, and pt did not want to wait for discharge Tylenol medication to be sent up from [facility]. Pt. was given times of last dose for all discharge medications in writing on discharge medication sheets. Discharge teaching included PO medication, pain management, activity restrictions, and signs and symptoms of infection. Pt. had no further questions at the time of discharge and no unmet needs were identified.

## 2020-07-17 NOTE — DISCHARGE SUMMARY
Discharge Summary     Pj Monge MRN# [id number]   YOB: 1960 Age: 60 year old     Date of Admission:  7/16/2020  Date of Discharge::  7/17/2020  1:17 PM  Admitting Physician:  Koko Price MD  Discharge Physician:  Evan Grant MD  Primary Care Physician:         Andreea Rivas          Admission Diagnoses:   Urinary retention [R33.9]          Discharge Diagnosis:   Same as above         Procedures:   7/16/2020: Procedure(s):  ENUCLEATION, PROSTATE, USING HOLMIUM LASER        Non-operative procedures:   None performed          Consultations:   None         Imaging Studies:   No results found for this or any previous visit.         Medications Prior to Admission:     No medications prior to admission.            Discharge Medications:     Discharge Medication List as of 7/17/2020 12:21 PM      START taking these medications    Details   acetaminophen (TYLENOL) 325 MG tablet Take 2 tablets (650 mg) by mouth every 6 hours as needed for mild pain, Disp-100 tablet,R-0, E-Prescribe         CONTINUE these medications which have NOT CHANGED    Details   ciprofloxacin (CIPRO) 500 MG tablet Take 1 tablet (500 mg) by mouth 2 times daily for 7 days, Disp-14 tablet,R-0, E-Prescribe      medical cannabis (Patient's own supply) See Admin Instructions (The purpose of this order is to document that the patient reports taking medical cannabis.  This is not a prescription, and is not used to certify that the patient has a qualifying medical condition.), Historical      venlafaxine (EFFEXOR-XR) 75 MG 24 hr capsule Take 75 mg by mouth daily, Historical                    Brief History of Illness:   Reason for admission requiring a surgical or invasive procedure:   Urinary retention [R33.9]   The patient underwent the following procedure(s):   See above   There were no immediate complications during this procedure.    Please refer to the full operative summary for  details.           Hospital Course:   The patient's hospital course was unremarkable.  Pj Monge recovered as anticipated and experienced no post-operative complications.     On POD#1 patient was ambulating without assitance, tolerating the discharge diet, had pain controlled with PO medications to go home with, and requiring no IV medications or fluids. Patient was discharged home with appropriate contact information, follow-up and instructions as seen below in the discharge paperwork.         Final Pathology Result:   Pending at time of discharge         Discharge Instructions and Follow-Up:     Discharge Procedure Orders   Reason for your hospital stay   Order Comments: [facility]     Follow Up and recommended labs and tests   Order Comments: Please see below     Activity   Order Comments: Please see below     Order Specific Question Answer Comments   Is discharge order? Yes      Discharge Instructions   Order Comments: Activity  - No strenuous exercise for 6 weeks.  - No lifting, pushing, pulling more than 10 pounds for 6 weeks.   - Do not strain with bowel movements.  - Do not drive until you can press the brake pedal quickly and fully without pain.   - Do not operate a motor vehicle while taking narcotic pain medications.     Urination  - Catheter removed prior to discharge  - Some light redness (up to a watermelon-like-pink in color) is expected in the upcoming 7-10days.   - If having hematuria (blood in the urine), make sure to increase your water intake and monitor for improvement  - If you are unable to urinate you should return urgently to the ED or call clinic to try to arrange for an urgent visit same day.   - You may leak urine for the next 6-12 weeks, please use a pad as needed    Medications  - Take tylenol as needed for pain  - Please finish your course of ciprofloxacin (antibiotic) when you get home    Follow-Up:  - Follow up with Dr. Price as planned 8/25/2020  - Call your primary care  "provider to touch base regarding your recent admission.    - Call or return sooner than your regularly scheduled visit if you develop any of the following: fever (greater than 101.5), uncontrolled pain, uncontrolled nausea or vomiting, worsening blood in urine or inability to urinate as above.    Phone numbers:   - Monday through Friday 8am to 4:30pm: Call [phone] with questions or to schedule or confirm appointment.    - Nights or weekends: call the after hours emergency pager - [phone] and tell the  \"I would like to page the Urology Resident on call.\"  - For emergencies, call [phone]     Full Code     Order Specific Question Answer Comments   Code status determined by: Unable to discuss and no AD/POLST on file; continue PREVIOUSLY ORDERED code status      Diet   Order Comments: Follow this diet upon discharge: Regular     Order Specific Question Answer Comments   Is discharge order? Yes             Discharge Disposition:     Discharged to Home      Condition at discharge: Good    --    Evan Grant MD  Urology PGY3    3:45 PM, 7/17/2020   "

## 2020-07-17 NOTE — PROGRESS NOTES
"Urology  Progress Note    - NAEON  - Slept well  - No pain  - No new concerns this AM  - Catheter faint pink on moderate CBI drip    Exam  /72 (BP Location: Left arm)   Pulse 59   Temp 97.3  F (36.3  C) (Oral)   Resp 24   Ht 1.753 m (5' 9\")   Wt 66.5 kg (146 lb 9.7 oz)   SpO2 94%   BMI 21.65 kg/m    No acute distress  Non-labored breathing on RA  Abdomen soft, non-distended, nontender  Lower extremities non-edematous bilaterally  Vega faint pink in appearance with moderate drip CBI    UOP CBI    Labs  WBC   Date Value Ref Range Status   07/17/2020 12.6 (H) 4.0 - 11.0 10e9/L Final      Hemoglobin   Date Value Ref Range Status   07/17/2020 11.3 (L) 13.3 - 17.7 g/dL Final      Platelet Count   Date Value Ref Range Status   07/17/2020 253 150 - 450 10e9/L Final      Creatinine   Date Value Ref Range Status   07/17/2020 0.91 0.66 - 1.25 mg/dL Final      Potassium   Date Value Ref Range Status   07/17/2020 4.2 3.4 - 5.3 mmol/L Final        Assessment/Plan  60 year old y/o male POD#1 s/p HoLEP.     Neuro: Tylenol, PRN oxy for pain control. Home gabapentin.  CV: MAX  Pulm: incentive spirometry while awake  FEN/GI: Regular diet, MIVF discontinued  Endo: MAX  : Irrigated, TOV this AM  Heme: Hgb stable.  ID: Finishing IV abx, home on cipro  Activity: Up ad crow, ambulation encouraged  PPx: SCDs  Dispo: Home pending successful TOV    Seen and examined with the chief resident. Will discuss with Dr. Price.    Evan Grant MD  Urology PGY3      Contacting the Urology Team     Please use the following job codes to reach the Urology Team. Note that you must use an in house phone and that job codes cannot receive text pages.     On weekdays, dial [phone] (or star-star-star 777 on the new [employer] telephones) then [phone] to reach the Adult Urology resident or PA on call    On weekdays, dial [phone] (or star-star-star 777 on the new [employer] telephones) then 0818 to reach the Pediatric Urology resident    On weeknights and " weekends, dial [phone] (or star-star-star 777 on the new [employer] telephones) then [phone] to reach the Urology resident on call (for both Adult and Pediatrics)

## 2020-07-17 NOTE — ANESTHESIA POSTPROCEDURE EVALUATION
Anesthesia POST Procedure Evaluation    Patient: Pj Monge   MRN:     [id number] Gender:   male   Age:    60 year old :      1960        Preoperative Diagnosis: Urinary retention [R33.9]   Procedure(s):  ENUCLEATION, PROSTATE, USING HOLMIUM LASER   Postop Comments: No value filed.     Anesthesia Type: General          Postop Pain Control: Uneventful            Sign Out: Well controlled pain   PONV: No   Neuro/Psych: Uneventful            Sign Out: Acceptable/Baseline neuro status   Airway/Respiratory: Uneventful            Sign Out: Acceptable/Baseline resp. status   CV/Hemodynamics: Uneventful            Sign Out: Acceptable CV status   Other NRE: NONE   DID A NON-ROUTINE EVENT OCCUR? No         Last Anesthesia Record Vitals:  CRNA VITALS  2020 1252 - 2020 1352      2020             Resp Rate (observed):  16          Last PACU Vitals:  Vitals Value Taken Time   /72 2020 11:57 PM   Temp 36.3  C (97.3  F) 2020 11:57 PM   Pulse 59 2020  3:00 AM   Resp 24 2020  3:00 AM   SpO2 94 % 2020  3:00 AM   Temp src     NIBP 145/91 2020  1:27 PM   Pulse     SpO2 98 % 2020  1:29 PM   Resp     Temp 36.4  C (97.5  F) 2020  1:29 PM   Ht Rate 58 2020  1:27 PM   Temp 2           Electronically Signed By: Amparo Nieves MD, 2020, 6:56 AM

## 2020-07-17 NOTE — OP NOTE
Operative Report  7/16/2020    PREOPERATIVE DIAGNOSIS:  Prostatic hypertrophy with urinary retention  POSTOPERATIVE DIAGNOSIS: Same as above    PROCEDURE PERFORMED: Holmium laser enucleation of the prostate  ATTENDING SURGEON: Koko Price MD  RESIDENT SURGEON: Evan Frey MD    FINDINGS: Approximately 175 gm prostate with bilateral lobe hypertrophy. Bladder with severe trabeculation  ANESTHESIA: General  INTRAVENOUS FLUIDS: See anesthesia records  ESTIMATED BLOOD LOSS: 200 ml   SPECIMENS: Prostate adenoma  DRAINS: 22-Bangladeshi 3-way catheter with 70 ml in balloon     INDICATIONS FOR PROCEDURE: Pj Monge is a(n) 60 year old male who was seen in consultation for urinary retention. He has elected treatment with laser enucleation. Prostate volume estimated to be 175 grams. His digital rectal exam was unremarkable.  After discussion of the risks, benefits and alternatives of the procedure, the patient agreed to proceed with the above stated procdure.    DESCRIPTION OF PROCEDURE: After obtaining informed consent, the patient was taken to the operating room and placed under general anesthesia.  He was repositioned in dorsal lithotomy making sure that the legs were positioned and padded safely.  He was then prepped and draped in standard sterile fashion.  Culture directed antibiotics were administered and bilateral sequential compression devices were placed.  A time out was performed confirming the appropriate patient identity and planned procedure.     The procedure was begun by generously lubricating the urethra.  The urethra was noted to be patent and did not require use of the shona urethrotome in order to place the 26F outer sheath.  The outer sheath was placed over a deflecting obturator and we then looked the scope through the posterior urethra and into the bladder.  The prostate was noted to have a bilobar configuration.  At this point we inserted the 550 micron holmium laser through the 7F  laser catheter.    We began enucleation by making a 6 o'clock incision.  We carried this incision down to the prostatic capsule from the bladder neck towards the apex just proximal to the veromontanum.  We then proceeded with a bottom up  approach, electing to enucleate the left lobe first.  We performed the majority of the dissection at 40 mazariegos making sure to keep the energy at 40 mazariegos or lower when working near the apex.  The capsular planes on this side were noted to be good.  Once the majority of the lobe was dissected we isolated the mucosal strip and transected it with the laser at 40 mazariegos.  We then proceeded to take down the remaining lateral and posterior attachments and pushed the lobe into the bladder.  We then enucleated the contralateral lobe using a top down approach.  Capsular planes were noted to be good on this side.  The remaining bladder neck attachments were identified and transected, freeing the lobe up entirely. Total enucleation time was 49 minutes.    At this point there was a moderate amount of bleeding and approximately 10 minutes were spent identifying bleeding vessels in the fossa and coagulating them with the laser.  Once hemostasis was adequate we switched from the laser resectoscope to the 26F offset telescope with the Piranha morcellation device.  We added an extra inflow to distend the bladder.  The blades were adjusted and set at a rate of 1500 RPM.  We proceeded with morcellation making sure that we morcellated the entirety of the adenoma.  Total morcellation time was 17 minutes.     We then switched back to the laser resectoscope one final time to ensure that no residual tissue was left in the bladder.  We also confirmed that the bladder was unharmed from morcellation and that both ureteral orifices were unharmed during the procedure.  We inspected the fossa and obtained final hemostasis.   We looked the scope out noting that the sphincter was completely intact without evidence  of thermal or mechanical injury.     We lubricated the urethra again and passed a 22F 3-way franklin catheter over a catheter guide.  The urine was noted to be pink.  We filled the balloon with 75 ml of sterile water and did not place the catheter on traction.  The patient was woken from anesthesia and taken to the recovery room in stable condition.     The specimen was weighed and found to be approximately 152 g.     POSTOPERATIVE PLAN:   -We will monitor the patient post operatively on continuous bladder irrigation for signs of ongling bleeding.  If clear we will consider discharge with franklin removal as an outpatient.  If continues to have ongoing bleeding concerning for clot formation without bladder irrigation will plan to admit for observation    Koko Price

## 2020-07-17 NOTE — PLAN OF CARE
Pt. A&Ox4. VSS. Afebrile. Lung sounds CTA, diminished in bases. Maintaining sats on w/ 1LPM O2 via NC, weaned to RA this am, tolerating well. IS encouraged. Bowel sounds active, LBM 7/16. CMS and neuro's are intact. Denies numbness and tingling in all extremities. Denies nausea, shortness of breath, and chest pain. Negligible pain. CBI running, light pink in color. Tolerating regular diet. Dressing to penis is intact, reinforced this shift as there was drainage around the dressing but not saturating it. Pt up with SBA. PIV is patent and infusing. PCD's on BLE's. Call light is within reach, pt able to make needs known and is resting comfortably between cares. Will continue to monitor.  Vega removed per Urology this am, first void 175mL with 193mL PVR. Voided another 25mL about 10 minutes later.

## 2020-07-17 NOTE — PLAN OF CARE
Pt. admitted from PACU at 1615. Pt. accompanied by transport and arrived with personal belongings. Report was taken from RN in PACU. Pt. is A&Ox4. CAPNO on and activated. VSS. 02 sats= 95% on 1 LPM via nasal cannula. Lung sounds= clear bilaterally with both anterior and posterior, diminished in bilateral bases. Bowel sounds are active in all 4 quadrants. Last BM 7/16 before surgery per pt. CMS and Neuro's are intact. Denies numbness and tingling in all extremities. Pt had negligible pain and declined pain interventions this shift. Pt. denied nausea, CP, SOB, lightheadedness, and dizziness. Is on a regular diet and appetite was good. Dressing to penis was saturated and changed this shift, now clean, dry, and intact. Catheter cares done with dressing change, pt tolerated well. CBI running. PIV is patent and infusing in left arm. PCD's are in place to BLE's. Pt. is oriented to the room and call light system and the call light is within reach. Continue to monitor.

## 2020-07-20 LAB — COPATH REPORT: NORMAL

## 2020-08-10 ENCOUNTER — PRE VISIT (OUTPATIENT)
Dept: UROLOGY | Facility: CLINIC | Age: 60
End: 2020-08-10

## 2020-08-25 ENCOUNTER — VIRTUAL VISIT (OUTPATIENT)
Dept: UROLOGY | Facility: CLINIC | Age: 60
End: 2020-08-25
Payer: COMMERCIAL

## 2020-08-25 DIAGNOSIS — R33.9 URINARY RETENTION: Primary | ICD-10-CM

## 2020-08-25 PROBLEM — F10.11 HISTORY OF ALCOHOL ABUSE: Status: ACTIVE | Noted: 2020-08-25

## 2020-08-25 PROBLEM — N13.9 OBSTRUCTIVE UROPATHY: Status: ACTIVE | Noted: 2020-08-25

## 2020-08-25 PROBLEM — Z72.0 TOBACCO USER: Status: ACTIVE | Noted: 2020-08-25

## 2020-08-25 PROBLEM — M06.9 RHEUMATOID ARTHRITIS (H): Status: ACTIVE | Noted: 2020-08-25

## 2020-08-25 PROBLEM — F21 SCHIZOTYPAL PERSONALITY DISORDER (H): Status: ACTIVE | Noted: 2020-08-25

## 2020-08-25 PROBLEM — R97.20 ELEVATED PSA: Status: ACTIVE | Noted: 2020-08-25

## 2020-08-25 PROBLEM — F33.1 MODERATE RECURRENT MAJOR DEPRESSION (H): Status: ACTIVE | Noted: 2020-08-25

## 2020-08-25 PROBLEM — R63.0 ANOREXIA: Status: ACTIVE | Noted: 2020-08-25

## 2020-08-25 PROBLEM — N40.1 LOWER URINARY TRACT SYMPTOMS DUE TO BENIGN PROSTATIC HYPERPLASIA: Status: ACTIVE | Noted: 2020-08-25

## 2020-08-25 PROBLEM — I10 ESSENTIAL HYPERTENSION: Status: ACTIVE | Noted: 2020-08-25

## 2020-08-25 PROBLEM — F12.10 CANNABIS ABUSE: Status: ACTIVE | Noted: 2020-08-25

## 2020-08-25 PROBLEM — F41.1 GENERALIZED ANXIETY DISORDER: Status: ACTIVE | Noted: 2020-08-25

## 2020-08-25 PROBLEM — C19: Status: ACTIVE | Noted: 2020-08-25

## 2020-08-25 RX ORDER — DICYCLOMINE HCL 20 MG
TABLET ORAL
COMMUNITY
Start: 2020-04-01

## 2020-08-25 RX ORDER — TAMSULOSIN HYDROCHLORIDE 0.4 MG/1
CAPSULE ORAL
COMMUNITY

## 2020-08-25 RX ORDER — RIFAMPIN 150 MG/1
CAPSULE ORAL
COMMUNITY
Start: 2020-08-07 | End: 2020-08-25

## 2020-08-25 RX ORDER — POLYETHYLENE GLYCOL 3350 17 G/17G
POWDER, FOR SOLUTION ORAL
COMMUNITY

## 2020-08-25 RX ORDER — OXYBUTYNIN CHLORIDE 10 MG/1
TABLET, EXTENDED RELEASE ORAL
COMMUNITY

## 2020-08-25 RX ORDER — RIFAXIMIN 550 MG/1
TABLET ORAL
COMMUNITY
Start: 2020-08-24

## 2020-08-25 RX ORDER — BACLOFEN 10 MG/1
TABLET ORAL
COMMUNITY

## 2020-08-25 ASSESSMENT — PAIN SCALES - GENERAL: PAINLEVEL: NO PAIN (0)

## 2020-08-25 NOTE — PATIENT INSTRUCTIONS
Follow up with Dr. Price as needed.    It was a pleasure meeting with you today.  Thank you for allowing me and my team the privilege of caring for you today.  YOU are the reason we are here, and I truly hope we provided you with the excellent service you deserve.  Please let us know if there is anything else we can do for you so that we can be sure you are leaving completely satisfied with your care experience.        Claudia Ortega, CMA

## 2020-08-25 NOTE — LETTER
8/25/2020       RE: Pj Monge  [address]  [address] [place] [address]     Dear Colleague,    Thank you for referring your patient, Pj Monge, to the [facility] UROLOGY AND INST FOR PROSTATE AND UROLOGIC CANCERS at [facility]. Please see a copy of my visit note below.    Pj Monge is a 60 year old male who is being evaluated via a billable telephone visit.            UROLOGY TELEPHONE FOLLOW-UP NOTE           Chief Complaint:   Urinary Retention         Interval Update    Pj Monge is a very pleasant 61 yo M with a history of urinary retention.  He is s/p HoLEP 6 weeks ago.     Today notes: Doing well from a urinary perspective.  He is voiding very well with an excellent stream.  He has no hematuria, no dysuria, no urgency.  No UTI's since surgery.  He uses one pad per day for some post-void dribbling.  This is improving and not bothering him at the moment.  His main issue is feeling bloating which has been an issue for years.  He is working with a local physician for this, it does correlate to urinary symptoms. Feels empty after voiding, he is delighted with urinary symptoms at the moment.    Prostate pathology was benign:  FINAL DIAGNOSIS:   A. Prostate, transurethral resection (111.6 grams):   - Benign prostatic hyperplasia   - Mixed inflammation and focal microabscess formation   - Negative for malignancy       Labs and Pathology:    I personally reviewed all applicable laboratory data and went over findings with patient  Significant for:    CBC RESULTS:  Recent Labs   Lab Test 07/17/20  0521   WBC 12.6*   HGB 11.3*           BMP RESULTS:  Recent Labs   Lab Test 07/17/20  0521      POTASSIUM 4.2   CHLORIDE 109   CO2 33*   ANIONGAP 1*   GLC 92   BUN 10   CR 0.91   GFRESTIMATED >90   GFRESTBLACK >90   PINEDA 8.1*                Assessment/Plan   60 year old male s/p HoLEP, doing well  -Update PSA, he will ask his local urologist for  assistance with ordering this.  Chooses to follow-up locally with Dr. Lazcano.               Past Medical History:     Past Medical History:   Diagnosis Date     RA (rheumatoid arthritis) (H)      Rectal cancer (H)             Past Surgical History:     Past Surgical History:   Procedure Laterality Date     LASER HOLMIUM ENUCLEATION PROSTATE N/A 7/16/2020    Procedure: ENUCLEATION, PROSTATE, USING HOLMIUM LASER;  Surgeon: Koko Price MD;  Location: [facility] OR            Medications     Current Outpatient Medications   Medication     acetaminophen (TYLENOL) 325 MG tablet     baclofen (LIORESAL) 10 MG tablet     dicyclomine (BENTYL) 20 MG tablet     diphenhydrAMINE HCl (BENADRYL PO)     medical cannabis (Patient's own supply)     oxybutynin ER (DITROPAN-XL) 10 MG 24 hr tablet     polyethylene glycol (MIRALAX) 17 GM/Dose powder     tamsulosin (FLOMAX) 0.4 MG capsule     venlafaxine (EFFEXOR-XR) 75 MG 24 hr capsule     XIFAXAN 550 MG TABS tablet     No current facility-administered medications for this visit.             Family History:   No family history on file.         Social History:     Social History     Socioeconomic History     Marital status:      Spouse name: Not on file     Number of children: Not on file     Years of education: Not on file     Highest education level: Not on file   Occupational History     Not on file   Social Needs     Financial resource strain: Not on file     Food insecurity     Worry: Not on file     Inability: Not on file     Transportation needs     Medical: Not on file     Non-medical: Not on file   Tobacco Use     Smoking status: Current Every Day Smoker     Packs/day: 1.00     Smokeless tobacco: Never Used   Substance and Sexual Activity     Alcohol use: Not Currently     Comment: sober for about 17 years      Drug use: Yes     Types: Marijuana     Comment: used medical (oil that he smokes), on occasion smokes/eats some non-medical marijuana      Sexual activity: Not on  "file   Lifestyle     Physical activity     Days per week: Not on file     Minutes per session: Not on file     Stress: Not on file   Relationships     Social connections     Talks on phone: Not on file     Gets together: Not on file     Attends Jainism service: Not on file     Active member of club or organization: Not on file     Attends meetings of clubs or organizations: Not on file     Relationship status: Not on file     Intimate partner violence     Fear of current or ex partner: Not on file     Emotionally abused: Not on file     Physically abused: Not on file     Forced sexual activity: Not on file   Other Topics Concern     Parent/sibling w/ CABG, MI or angioplasty before 65F 55M? Not Asked   Social History Narrative     Not on file            Allergies:   Patient has no known allergies.         Review of Systems:  From intake questionnaire   Negative 14 system review except as noted on HPI, nurse's note.        CC:  Andreea Rivas        The patient has been notified of following:     \"This telephone visit will be conducted via a call between you and your physician/provider. We have found that certain health care needs can be provided without the need for a physical exam.  This service lets us provide the care you need with a short phone conversation.  If a prescription is necessary we can send it directly to your pharmacy.  If lab work is needed we can place an order for that and you can then stop by our lab to have the test done at a later time.    Telephone visits are billed at different rates depending on your insurance coverage. During this emergency period, for some insurers they may be billed the same as an in-person visit.  Please reach out to your insurance provider with any questions.    If during the course of the call the physician/provider feels a telephone visit is not appropriate, you will not be charged for this service.\"    Patient has given verbal consent for Telephone visit?  " Yes    What phone number would you like to be contacted at? [phone]    How would you like to obtain your AVS? Mail a copy    Phone call duration: 8 minutes    Koko Price MD      CC:  Dr. Randi Lazcano

## 2020-08-25 NOTE — PROGRESS NOTES
Pj Monge is a 60 year old male who is being evaluated via a billable telephone visit.            UROLOGY TELEPHONE FOLLOW-UP NOTE           Chief Complaint:   Urinary Retention         Interval Update    Pj Monge is a very pleasant 61 yo M with a history of urinary retention.  He is s/p HoLEP 6 weeks ago.     Today notes: Doing well from a urinary perspective.  He is voiding very well with an excellent stream.  He has no hematuria, no dysuria, no urgency.  No UTI's since surgery.  He uses one pad per day for some post-void dribbling.  This is improving and not bothering him at the moment.  His main issue is feeling bloating which has been an issue for years.  He is working with a local physician for this, it does correlate to urinary symptoms. Feels empty after voiding, he is delighted with urinary symptoms at the moment.    Prostate pathology was benign:  FINAL DIAGNOSIS:   A. Prostate, transurethral resection (111.6 grams):   - Benign prostatic hyperplasia   - Mixed inflammation and focal microabscess formation   - Negative for malignancy       Labs and Pathology:    I personally reviewed all applicable laboratory data and went over findings with patient  Significant for:    CBC RESULTS:  Recent Labs   Lab Test 07/17/20  0521   WBC 12.6*   HGB 11.3*           BMP RESULTS:  Recent Labs   Lab Test 07/17/20  0521      POTASSIUM 4.2   CHLORIDE 109   CO2 33*   ANIONGAP 1*   GLC 92   BUN 10   CR 0.91   GFRESTIMATED >90   GFRESTBLACK >90   PINEDA 8.1*                Assessment/Plan   60 year old male s/p HoLEP, doing well  -Update PSA, he will ask his local urologist for assistance with ordering this.  Chooses to follow-up locally with Dr. Lazcano.               Past Medical History:     Past Medical History:   Diagnosis Date     RA (rheumatoid arthritis) (H)      Rectal cancer (H)             Past Surgical History:     Past Surgical History:   Procedure Laterality Date     LASER HOLMIUM  ENUCLEATION PROSTATE N/A 7/16/2020    Procedure: ENUCLEATION, PROSTATE, USING HOLMIUM LASER;  Surgeon: Koko Price MD;  Location: [facility] OR            Medications     Current Outpatient Medications   Medication     acetaminophen (TYLENOL) 325 MG tablet     baclofen (LIORESAL) 10 MG tablet     dicyclomine (BENTYL) 20 MG tablet     diphenhydrAMINE HCl (BENADRYL PO)     medical cannabis (Patient's own supply)     oxybutynin ER (DITROPAN-XL) 10 MG 24 hr tablet     polyethylene glycol (MIRALAX) 17 GM/Dose powder     tamsulosin (FLOMAX) 0.4 MG capsule     venlafaxine (EFFEXOR-XR) 75 MG 24 hr capsule     XIFAXAN 550 MG TABS tablet     No current facility-administered medications for this visit.             Family History:   No family history on file.         Social History:     Social History     Socioeconomic History     Marital status:      Spouse name: Not on file     Number of children: Not on file     Years of education: Not on file     Highest education level: Not on file   Occupational History     Not on file   Social Needs     Financial resource strain: Not on file     Food insecurity     Worry: Not on file     Inability: Not on file     Transportation needs     Medical: Not on file     Non-medical: Not on file   Tobacco Use     Smoking status: Current Every Day Smoker     Packs/day: 1.00     Smokeless tobacco: Never Used   Substance and Sexual Activity     Alcohol use: Not Currently     Comment: sober for about 17 years      Drug use: Yes     Types: Marijuana     Comment: used medical (oil that he smokes), on occasion smokes/eats some non-medical marijuana      Sexual activity: Not on file   Lifestyle     Physical activity     Days per week: Not on file     Minutes per session: Not on file     Stress: Not on file   Relationships     Social connections     Talks on phone: Not on file     Gets together: Not on file     Attends Yarsani service: Not on file     Active member of club or organization: Not  "on file     Attends meetings of clubs or organizations: Not on file     Relationship status: Not on file     Intimate partner violence     Fear of current or ex partner: Not on file     Emotionally abused: Not on file     Physically abused: Not on file     Forced sexual activity: Not on file   Other Topics Concern     Parent/sibling w/ CABG, MI or angioplasty before 65F 55M? Not Asked   Social History Narrative     Not on file            Allergies:   Patient has no known allergies.         Review of Systems:  From intake questionnaire   Negative 14 system review except as noted on HPI, nurse's note.        CC:  Andreea Rivas        The patient has been notified of following:     \"This telephone visit will be conducted via a call between you and your physician/provider. We have found that certain health care needs can be provided without the need for a physical exam.  This service lets us provide the care you need with a short phone conversation.  If a prescription is necessary we can send it directly to your pharmacy.  If lab work is needed we can place an order for that and you can then stop by our lab to have the test done at a later time.    Telephone visits are billed at different rates depending on your insurance coverage. During this emergency period, for some insurers they may be billed the same as an in-person visit.  Please reach out to your insurance provider with any questions.    If during the course of the call the physician/provider feels a telephone visit is not appropriate, you will not be charged for this service.\"    Patient has given verbal consent for Telephone visit?  Yes    What phone number would you like to be contacted at? [phone]    How would you like to obtain your AVS? Mail a copy    Phone call duration: 8 minutes    Koko Price MD      CC:  Dr. Randi Lazcano    "

## 2020-08-25 NOTE — NURSING NOTE
Chief Complaint   Patient presents with     Surgical Followup     Post op.  S/P HoLEP on 7/16/2020     Claudia Ortega, CMA

## 2022-07-07 NOTE — TELEPHONE ENCOUNTER
Called  he is doing this on July 9th  at [facility] in [facility] and he remembers the urine culture.   Detail Level: Zone Render In Strict Bullet Format?: No Continue Regimen: clobetasol 0.05 % topical cream BID\\nQuantity: 60.0 g  Days Supply: 30\\nSig: Apply twice daily to AA of arm x  2 weeks/month as needed. Initiate Treatment: tacrolimus 0.1 % topical ointment \\nQuantity: 100.0 g  Days Supply: 30\\nSig: Apply to AA of arms alternating with clobetasol QOD

## (undated) DEVICE — PAD CHUX UNDERPAD 30X36" P3036C

## (undated) DEVICE — DRAPE MAYO STAND 23X54 8337

## (undated) DEVICE — DRSG ABDOMINAL 07 1/2X8" 7197D

## (undated) DEVICE — DRSG GAUZE 4X8" NON21842

## (undated) DEVICE — TUBING SET THERMEDX UROLOGY SGL USE LL0006

## (undated) DEVICE — LINEN GOWN X4 5410

## (undated) DEVICE — SOL NACL 0.9% IRRIG 1000ML BOTTLE 2F7124

## (undated) DEVICE — STRAP KNEE/BODY 31143004

## (undated) DEVICE — TUBING SET PIRANHA 41702208

## (undated) DEVICE — Device

## (undated) DEVICE — GLOVE PROTEXIS W/NEU-THERA 7.5  2D73TE75

## (undated) DEVICE — CATH LASER URETERAL 7.1FRX40CM G17797  022403-7.1-40

## (undated) DEVICE — FILTER PIRANHA DISP 2228.901

## (undated) DEVICE — TUBING SUCTION MEDI-VAC 1/4"X20' N620A

## (undated) DEVICE — BLADE MORCELLATOR WOLF PIRANHA 4.75X385MM 49700103

## (undated) DEVICE — LINEN TOWEL PACK X5 5464

## (undated) DEVICE — TUBING IRRIG CYSTO/BLADDER SET 81" LF 2C4040

## (undated) DEVICE — BAG URINARY DRAIN LUBRISIL IC 4000ML LF 253509A

## (undated) DEVICE — SUCTION MANIFOLD DORNOCH ULTRA CART UL-CL500

## (undated) DEVICE — SYR 70ML TOOMEY 041170

## (undated) DEVICE — CATH FOLEY 3WAY 22FR 30ML SIL 73022SI

## (undated) DEVICE — SOL WATER IRRIG 1000ML BOTTLE 2F7114

## (undated) DEVICE — LIGHT HANDLE X2

## (undated) DEVICE — SPECIMEN TRAP TISSUE CONTAINER PIRANHA 2208120

## (undated) DEVICE — SUCTION WATERBUG FLOOR PUDDLE VAC 9321

## (undated) DEVICE — TAPE DURAPORE 3" SILK 1538-3

## (undated) DEVICE — LASER FIBER HOLMIUM MOSES 550 D/F/L AC-10030120

## (undated) DEVICE — SOL NACL 0.9% IRRIG 3000ML BAG 2B7477

## (undated) DEVICE — SYR 50ML LL W/O NDL 309653

## (undated) DEVICE — CATH SECURE 5445-3

## (undated) RX ORDER — FENTANYL CITRATE 50 UG/ML
INJECTION, SOLUTION INTRAMUSCULAR; INTRAVENOUS
Status: DISPENSED
Start: 2020-07-16

## (undated) RX ORDER — LIDOCAINE HYDROCHLORIDE 20 MG/ML
INJECTION, SOLUTION EPIDURAL; INFILTRATION; INTRACAUDAL; PERINEURAL
Status: DISPENSED
Start: 2020-07-16

## (undated) RX ORDER — ONDANSETRON 2 MG/ML
INJECTION INTRAMUSCULAR; INTRAVENOUS
Status: DISPENSED
Start: 2020-07-16

## (undated) RX ORDER — HYDROMORPHONE HYDROCHLORIDE 1 MG/ML
INJECTION, SOLUTION INTRAMUSCULAR; INTRAVENOUS; SUBCUTANEOUS
Status: DISPENSED
Start: 2020-07-16

## (undated) RX ORDER — VANCOMYCIN HYDROCHLORIDE 1 G/20ML
INJECTION, POWDER, LYOPHILIZED, FOR SOLUTION INTRAVENOUS
Status: DISPENSED
Start: 2020-07-16

## (undated) RX ORDER — GABAPENTIN 300 MG/1
CAPSULE ORAL
Status: DISPENSED
Start: 2020-07-16

## (undated) RX ORDER — VANCOMYCIN HYDROCHLORIDE 1 G/200ML
INJECTION, SOLUTION INTRAVENOUS
Status: DISPENSED
Start: 2020-07-16

## (undated) RX ORDER — ACETAMINOPHEN 325 MG/1
TABLET ORAL
Status: DISPENSED
Start: 2020-07-16